# Patient Record
Sex: MALE | Race: WHITE | NOT HISPANIC OR LATINO | Employment: UNEMPLOYED | URBAN - METROPOLITAN AREA
[De-identification: names, ages, dates, MRNs, and addresses within clinical notes are randomized per-mention and may not be internally consistent; named-entity substitution may affect disease eponyms.]

---

## 2020-08-04 ENCOUNTER — OFFICE VISIT (OUTPATIENT)
Dept: CARDIAC SURGERY | Facility: CLINIC | Age: 70
End: 2020-08-04
Payer: MEDICARE

## 2020-08-04 VITALS
HEIGHT: 71 IN | SYSTOLIC BLOOD PRESSURE: 132 MMHG | HEART RATE: 60 BPM | WEIGHT: 207.81 LBS | BODY MASS INDEX: 29.09 KG/M2 | DIASTOLIC BLOOD PRESSURE: 79 MMHG

## 2020-08-04 DIAGNOSIS — I70.0 ATHEROSCLEROSIS OF ABDOMINAL AORTA: ICD-10-CM

## 2020-08-04 DIAGNOSIS — I77.811 ECTATIC ABDOMINAL AORTA: ICD-10-CM

## 2020-08-04 PROCEDURE — 99999 PR PBB SHADOW E&M-NEW PATIENT-LVL IV: ICD-10-PCS | Mod: PBBFAC,,, | Performed by: THORACIC SURGERY (CARDIOTHORACIC VASCULAR SURGERY)

## 2020-08-04 PROCEDURE — 99205 OFFICE O/P NEW HI 60 MIN: CPT | Mod: S$PBB,,, | Performed by: THORACIC SURGERY (CARDIOTHORACIC VASCULAR SURGERY)

## 2020-08-04 PROCEDURE — 99204 OFFICE O/P NEW MOD 45 MIN: CPT | Mod: PBBFAC,PO | Performed by: THORACIC SURGERY (CARDIOTHORACIC VASCULAR SURGERY)

## 2020-08-04 PROCEDURE — 99999 PR PBB SHADOW E&M-NEW PATIENT-LVL IV: CPT | Mod: PBBFAC,,, | Performed by: THORACIC SURGERY (CARDIOTHORACIC VASCULAR SURGERY)

## 2020-08-04 PROCEDURE — 99205 PR OFFICE/OUTPT VISIT, NEW, LEVL V, 60-74 MIN: ICD-10-PCS | Mod: S$PBB,,, | Performed by: THORACIC SURGERY (CARDIOTHORACIC VASCULAR SURGERY)

## 2020-08-04 RX ORDER — PANTOPRAZOLE SODIUM 40 MG/1
TABLET, DELAYED RELEASE ORAL
COMMUNITY
Start: 2020-07-02

## 2020-08-04 RX ORDER — METOPROLOL TARTRATE 100 MG/1
100 TABLET ORAL 2 TIMES DAILY
COMMUNITY
Start: 2020-05-18

## 2020-08-04 RX ORDER — TADALAFIL 20 MG/1
TABLET ORAL
COMMUNITY
Start: 2016-08-29

## 2020-08-04 RX ORDER — ASPIRIN 81 MG/1
81 TABLET ORAL
COMMUNITY

## 2020-08-04 RX ORDER — SPIRONOLACTONE 25 MG/1
25 TABLET ORAL DAILY
COMMUNITY
Start: 2020-05-17

## 2020-08-04 RX ORDER — DICYCLOMINE HYDROCHLORIDE 10 MG/5ML
20 SOLUTION ORAL
COMMUNITY
Start: 2020-02-03

## 2020-08-04 RX ORDER — CLOMIPHENE CITRATE 50 MG/1
TABLET ORAL
COMMUNITY
Start: 2020-07-01

## 2020-08-04 RX ORDER — ROSUVASTATIN CALCIUM 40 MG/1
40 TABLET, COATED ORAL DAILY
COMMUNITY
Start: 2020-04-29

## 2020-08-04 RX ORDER — ENALAPRIL MALEATE 20 MG/1
20 TABLET ORAL DAILY
COMMUNITY
Start: 2020-05-18

## 2020-08-04 RX ORDER — NIACIN 750 MG/1
TABLET, EXTENDED RELEASE ORAL
COMMUNITY
Start: 2020-07-02

## 2020-08-04 RX ORDER — ANASTROZOLE 1 MG/1
TABLET ORAL
COMMUNITY
Start: 2020-06-10

## 2020-08-04 RX ORDER — DUTASTERIDE 0.5 MG/1
0.5 CAPSULE, LIQUID FILLED ORAL
COMMUNITY
Start: 2015-08-17

## 2020-08-04 RX ORDER — AMLODIPINE BESYLATE 5 MG/1
5 TABLET ORAL 2 TIMES DAILY
COMMUNITY
Start: 2020-05-31

## 2020-08-04 RX ORDER — FLUTICASONE PROPIONATE 50 MCG
1 SPRAY, SUSPENSION (ML) NASAL
COMMUNITY

## 2020-08-04 RX ORDER — IBUPROFEN 800 MG/1
800 TABLET ORAL
COMMUNITY

## 2020-08-04 NOTE — PROGRESS NOTES
CHIEF COMPLAINT:   Chief Complaint   Patient presents with    AAA     [place]/AAA       REFERRING PROVIDER: Self, Aaareferral    Reason for consult:  Evaluation of abdominal aorta    History of Present Illness     Patient is a 70 y.o. male patient is a 70-year-old white male who had undergone CT scan of the lumbar spine.  On that study he was found to have some ectasia of the abdominal aorta that I will report below.  He has no family history of aortic aneurysm disease.  He is a nonsmoker.  He has no abdominal pain and no claudication.    There are no active problems to display for this patient.    Past Medical History:   Diagnosis Date    Coronary artery disease     Hyperlipidemia     Hypertension     Lumbar spine strain       Past Surgical History:   Procedure Laterality Date    CARDIAC CATHETERIZATION      CORONARY ANGIOPLASTY      CORONARY ARTERY BYPASS GRAFT  2011    HIP REPLACEMENT ARTHROPLASTY Left       Medication List with Changes/Refills   Current Medications    AMLODIPINE (NORVASC) 5 MG TABLET    Take 5 mg by mouth 2 (two) times daily.    ANASTROZOLE (ARIMIDEX) 1 MG TAB    TAKE 1 TABLET EVERY WEDNESDAY MORNING    ASPIRIN (ECOTRIN) 81 MG EC TABLET    Take 81 mg by mouth.    CLOMIPHENE (CLOMID) 50 MG TABLET    TAKE 1 TABLET EVERY DAY ON MONDAY, WEDNESDAY, AND FRIDAY    DICYCLOMINE (BENTYL) 10 MG/5 ML SYRUP    Take 20 mg by mouth.    DUTASTERIDE (AVODART) 0.5 MG CAPSULE    Take 0.5 mg by mouth.    ENALAPRIL (VASOTEC) 20 MG TABLET    Take 20 mg by mouth once daily.    FLUTICASONE PROPIONATE (FLONASE) 50 MCG/ACTUATION NASAL SPRAY    1 spray by Nasal route.    IBUPROFEN (ADVIL,MOTRIN) 800 MG TABLET    Take 800 mg by mouth.    METOPROLOL TARTRATE (LOPRESSOR) 100 MG TABLET    Take 100 mg by mouth 2 (two) times daily.    NIACIN (NIASPAN) 750 MG CR TABLET    TAKE 1 TABLET (750 MG TOTAL) BY MOUTH EVERY EVENING    PANTOPRAZOLE (PROTONIX) 40 MG TABLET    TAKE 1 TABLET (40 MG TOTAL) BY MOUTH ONCE DAILY AS  "NEEDED    ROSUVASTATIN (CRESTOR) 40 MG TAB    Take 40 mg by mouth once daily.    SPIRONOLACTONE (ALDACTONE) 25 MG TABLET    Take 25 mg by mouth once daily.    TADALAFIL (CIALIS) 20 MG TAB    TAKE 1 TABLET (20 MG TOTAL) BY MOUTH DAILY AS NEEDED FOR ERECTILE DYSFUNCTION.     Review of patient's allergies indicates:  No Known Allergies   Social History     Tobacco Use    Smoking status: Never Smoker   Substance Use Topics    Alcohol use: Never     Frequency: Never      Family History   Problem Relation Age of Onset    Cancer Mother     Heart disease Father     Hypertension Father       Review of Systems  General:  No fevers, chills, night sweats, weight changes.  HEENT:  No visual field changes or hoarseness. He has occasional headaches.  Neck:  No complaints.  Respiratory:  No shortness of breath, cough, hemoptysis.  Cardiac:  No angina, orthopnea, PND.  GI:  No abdominal pains.  :  Occasional urinary frequency.  Musculoskeletal:  Positive swollen feet and pain in the left leg from the hip to the foot intermittently.  Positive left hip soreness.  Positive back pain.  Neurologic:  Positive trouble sleeping.  Skin:  No rash.    Objective: Physical Exam     Vitals:    08/04/20 1402   BP: 132/79   Pulse: 60   Weight: 94.3 kg (207 lb 12.8 oz)   Height: 5' 11" (1.803 m)   PainSc: 0-No pain       Objective    General:  Well-nourished, well-developed man in no obvious distress.  HEENT:  Normocephalic, atraumatic.  There is good facial symmetry.    Neck:  Trachea is midline.  There are no carotid bruits.  Chest:  Lungs are clear bilaterally.  Heart:  Regular rate and rhythm.  No rubs or murmurs.  Abdomen:  Soft.  No abnormal pulsatility.  No bruits.  Extremities:  No cyanosis, clubbing, edema.  Vascular:  2+ femoral, radial, and posterior tibial bilaterally.  Skin:  No rash.  Neurologic:  Alert and oriented x4 no focal deficits.  Psychiatric:  Normal mood and affect.    Data Review:   No results found for: WBC, HGB, " HCT, MCV, PLT,   BMP No results found for: NA, K, CL, CO2, BUN, CREATININE, CALCIUM, ANIONGAP, ESTGFRAFRICA, EGFRNONAA,   CMP  No results found for: NA, K, CL, CO2, GLU, BUN, CREATININE, CALCIUM, PROT, ALBUMIN, BILITOT, ALKPHOS, AST, ALT, ANIONGAP, ESTGFRAFRICA, EGFRNONAA,   No results found for: INR, PROTIME    CT scan lumbar spine with contrast:  June 18, 2020  [place]  Bilobed fusiform dilation of the infrarenal abdominal aorta that is maximally 2.4 x 2.6 cm in its greatest transverse and AP dimensions and tapers prior to the bifurcation.  I have reviewed the study personally and agree with this interpretation.  Also, there is atherosclerotic calcification of the abdominal aorta.      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  Atherosclerotic calcification abdominal aorta.    Plan:     Follow in 1 year with ultrasound of the abdominal aorta.  If the aorta remains a similar size, would likely follow up with an ultrasound 2 years later.